# Patient Record
Sex: FEMALE | Race: OTHER | HISPANIC OR LATINO
[De-identification: names, ages, dates, MRNs, and addresses within clinical notes are randomized per-mention and may not be internally consistent; named-entity substitution may affect disease eponyms.]

---

## 2024-07-31 ENCOUNTER — APPOINTMENT (OUTPATIENT)
Dept: ORTHOPEDIC SURGERY | Facility: CLINIC | Age: 69
End: 2024-07-31
Payer: MEDICARE

## 2024-07-31 ENCOUNTER — RESULT REVIEW (OUTPATIENT)
Age: 69
End: 2024-07-31

## 2024-07-31 ENCOUNTER — OUTPATIENT (OUTPATIENT)
Dept: OUTPATIENT SERVICES | Facility: HOSPITAL | Age: 69
LOS: 1 days | End: 2024-07-31
Payer: MEDICARE

## 2024-07-31 VITALS
DIASTOLIC BLOOD PRESSURE: 85 MMHG | HEART RATE: 87 BPM | SYSTOLIC BLOOD PRESSURE: 160 MMHG | OXYGEN SATURATION: 96 % | WEIGHT: 185 LBS | HEIGHT: 66 IN | BODY MASS INDEX: 29.73 KG/M2

## 2024-07-31 DIAGNOSIS — I10 ESSENTIAL (PRIMARY) HYPERTENSION: ICD-10-CM

## 2024-07-31 DIAGNOSIS — M43.05: ICD-10-CM

## 2024-07-31 DIAGNOSIS — E11.9 TYPE 2 DIABETES MELLITUS W/OUT COMPLICATIONS: ICD-10-CM

## 2024-07-31 PROCEDURE — 72110 X-RAY EXAM L-2 SPINE 4/>VWS: CPT | Mod: 26

## 2024-07-31 PROCEDURE — 72110 X-RAY EXAM L-2 SPINE 4/>VWS: CPT

## 2024-07-31 PROCEDURE — 99204 OFFICE O/P NEW MOD 45 MIN: CPT | Mod: 25

## 2024-08-01 RX ORDER — CYCLOBENZAPRINE HYDROCHLORIDE 5 MG/1
5 TABLET, FILM COATED ORAL
Qty: 30 | Refills: 0 | Status: ACTIVE | COMMUNITY
Start: 2024-08-01 | End: 1900-01-01

## 2024-08-01 RX ORDER — MELOXICAM 7.5 MG/1
7.5 TABLET ORAL
Qty: 30 | Refills: 1 | Status: ACTIVE | COMMUNITY
Start: 2024-08-01 | End: 1900-01-01

## 2024-08-05 NOTE — PHYSICAL EXAM
[de-identified] : General: Well-nourished, well-developed, alert, and in no acute distress. Head: Normocephalic. Eyes: Pupils equal, extraocular muscles intact, normal sclera. Nose: No nasal discharge. Cardiovascular: Extremities are warm and well perfused. Distal pulses are symmetric bilaterally. Respiratory: No labored breathing. Extremities: Sensation is intact distally bilaterally. Distal pulses are symmetric bilaterally Lymphatic: No regional lymphadenopathy, no lymphedema Neurologic: No focal deficits Skin: Normal skin color, texture, and turgor Psychiatric: Normal affect  MSK: Examination of the Lumbar Spine: Gait: Slightly antalgic Ambulating independently. Able to toe walk, heel walk, tandem walk AROM: forward flexion limited due to pain, full and pain-free extension. Tender to palpation: midline, b/l thoracic and lumbar paraspinals. Nontender to palpation: SI jt, GTB, piriformis, gluteals   Lumbar Facet Loading [negative]     Right hip Range of Motion: Internal rotation: [25] degrees, External rotation: [80] degrees, Flexion [120] degrees     Log roll negative ERNESTINA negative FADIR negative   SLR negative. Tight Hamstrings Piriformis compression negative ASIS distraction negative Iliac compression negative   Left hip:   Range of Motion: Internal rotation: [25] degrees, External rotation: [80] degrees, Flexion [120] degrees   Special tests: ERNESTINA negative FADIR negative   SLR negative. Tight Hamstrings Piriformis compression negative ASIS distraction negative Iliac compression negative     Sensation is intact to light touch over the superficial and deep peroneal nerve distributions and the posterior tibial nerve distribution. Capillary refill is less than two seconds. Posterior tibial and dorsalis pedis pulses 2+ equal bilaterally. No calf swelling or tenderness bilaterally. Strength testing shows Hip flexion 5/5, Hip adduction 5/5, Hip abduction 5/5, Knee Extension 5/5, Knee Flexion 5/5, dorsiflexion 5/5, plantar flexion 5/5, EHL 5/5 Reflexes: Patellar 2+, Achilles 2+. Babinski negative.    [de-identified] : Date: 07/31/2024 Location: [facility] Body part: Lumbar Spine Impression: Dynamically stable slight L1 on L2, L2 on L3, and L3 on L4 retrolistheses without spondylolysis defects. Remaining vertebral body alignment maintained. Multilevel degenerative disease manifest by varying degrees of disc space narrowing with disc margin osteophyte formation and posterior facet arthrosis.  Unremarkable SI joints and partially visualized hips.  No lytic or blastic lesions.  Right upper quadrant surgical clips.

## 2024-08-05 NOTE — HISTORY OF PRESENT ILLNESS
[de-identified] : MONI DAHL is a 68 year old female presents today for initial visit of back pain. Pain started about 3 days without any injuries. Upper and lower back greater pain on the left side.  Pt. did Aleve and Ibuprofen to help treat the pain, pain patch and ice. Pt. denies any numbness or tingling down her legs and has not experienced any loss of bladder or bowel control.  Pt. states that pain occasionally radiates down her legs.  Pt. reports PMH of Diabetes Mellitus and Hypertension. Pt. is here for further evaluation.

## 2024-08-05 NOTE — PHYSICAL EXAM
[de-identified] : General: Well-nourished, well-developed, alert, and in no acute distress. Head: Normocephalic. Eyes: Pupils equal, extraocular muscles intact, normal sclera. Nose: No nasal discharge. Cardiovascular: Extremities are warm and well perfused. Distal pulses are symmetric bilaterally. Respiratory: No labored breathing. Extremities: Sensation is intact distally bilaterally. Distal pulses are symmetric bilaterally Lymphatic: No regional lymphadenopathy, no lymphedema Neurologic: No focal deficits Skin: Normal skin color, texture, and turgor Psychiatric: Normal affect  MSK: Examination of the Lumbar Spine: Gait: Slightly antalgic Ambulating independently. Able to toe walk, heel walk, tandem walk AROM: forward flexion limited due to pain, full and pain-free extension. Tender to palpation: midline, b/l thoracic and lumbar paraspinals. Nontender to palpation: SI jt, GTB, piriformis, gluteals   Lumbar Facet Loading [negative]     Right hip Range of Motion: Internal rotation: [25] degrees, External rotation: [80] degrees, Flexion [120] degrees     Log roll negative ERNESTINA negative FADIR negative   SLR negative. Tight Hamstrings Piriformis compression negative ASIS distraction negative Iliac compression negative   Left hip:   Range of Motion: Internal rotation: [25] degrees, External rotation: [80] degrees, Flexion [120] degrees   Special tests: ERNESTINA negative FADIR negative   SLR negative. Tight Hamstrings Piriformis compression negative ASIS distraction negative Iliac compression negative     Sensation is intact to light touch over the superficial and deep peroneal nerve distributions and the posterior tibial nerve distribution. Capillary refill is less than two seconds. Posterior tibial and dorsalis pedis pulses 2+ equal bilaterally. No calf swelling or tenderness bilaterally. Strength testing shows Hip flexion 5/5, Hip adduction 5/5, Hip abduction 5/5, Knee Extension 5/5, Knee Flexion 5/5, dorsiflexion 5/5, plantar flexion 5/5, EHL 5/5 Reflexes: Patellar 2+, Achilles 2+. Babinski negative.    [de-identified] : Date: 07/31/2024 Location: [facility] Body part: Lumbar Spine Impression: Dynamically stable slight L1 on L2, L2 on L3, and L3 on L4 retrolistheses without spondylolysis defects. Remaining vertebral body alignment maintained. Multilevel degenerative disease manifest by varying degrees of disc space narrowing with disc margin osteophyte formation and posterior facet arthrosis.  Unremarkable SI joints and partially visualized hips.  No lytic or blastic lesions.  Right upper quadrant surgical clips.

## 2024-08-05 NOTE — END OF VISIT
[FreeTextEntry3] : All medical record entries made by the Susu García (Mariia) were at my, Dr.Leslie Young, direction and personally dictated by me on 08/01/2024. I have reviewed the chart and agree that the record accurately reflects my personal performance of the history, physical exam, assessment, and plan. I have also personally directed, reviewed, and agreed with the chart. [Time Spent: ___ minutes] : I have spent [unfilled] minutes of time on the encounter.

## 2024-08-05 NOTE — HISTORY OF PRESENT ILLNESS
[de-identified] : MONI DAHL is a 68 year old female presents today for initial visit of back pain. Pain started about 3 days without any injuries. Upper and lower back greater pain on the left side.  Pt. did Aleve and Ibuprofen to help treat the pain, pain patch and ice. Pt. denies any numbness or tingling down her legs and has not experienced any loss of bladder or bowel control.  Pt. states that pain occasionally radiates down her legs.  Pt. reports PMH of Diabetes Mellitus and Hypertension. Pt. is here for further evaluation.

## 2024-08-05 NOTE — HISTORY OF PRESENT ILLNESS
[de-identified] : MONI DAHL is a 68 year old female presents today for initial visit of back pain. Pain started about 3 days without any injuries. Upper and lower back greater pain on the left side.  Pt. did Aleve and Ibuprofen to help treat the pain, pain patch and ice. Pt. denies any numbness or tingling down her legs and has not experienced any loss of bladder or bowel control.  Pt. states that pain occasionally radiates down her legs.  Pt. reports PMH of Diabetes Mellitus and Hypertension. Pt. is here for further evaluation.

## 2024-08-05 NOTE — DISCUSSION/SUMMARY

## 2024-08-05 NOTE — REASON FOR VISIT
[Initial Visit] : an initial visit for [[employer] ] : provided by [employer]   [FreeTextEntry2] : back pain [Interpreters_IDNumber] : [id number] [Interpreters_FullName] : Flor [TWNoteComboBox1] : [place]

## 2024-08-05 NOTE — DISCUSSION/SUMMARY

## 2024-08-05 NOTE — REASON FOR VISIT
[Initial Visit] : an initial visit for [[employer] ] : provided by [employer]   [FreeTextEntry2] : back pain [Interpreters_IDNumber] : [id number] [Interpreters_FullName] : Flor [TWNoteComboBox1] : [place]

## 2024-08-05 NOTE — PHYSICAL EXAM
[de-identified] : General: Well-nourished, well-developed, alert, and in no acute distress. Head: Normocephalic. Eyes: Pupils equal, extraocular muscles intact, normal sclera. Nose: No nasal discharge. Cardiovascular: Extremities are warm and well perfused. Distal pulses are symmetric bilaterally. Respiratory: No labored breathing. Extremities: Sensation is intact distally bilaterally. Distal pulses are symmetric bilaterally Lymphatic: No regional lymphadenopathy, no lymphedema Neurologic: No focal deficits Skin: Normal skin color, texture, and turgor Psychiatric: Normal affect  MSK: Examination of the Lumbar Spine: Gait: Slightly antalgic Ambulating independently. Able to toe walk, heel walk, tandem walk AROM: forward flexion limited due to pain, full and pain-free extension. Tender to palpation: midline, b/l thoracic and lumbar paraspinals. Nontender to palpation: SI jt, GTB, piriformis, gluteals   Lumbar Facet Loading [negative]     Right hip Range of Motion: Internal rotation: [25] degrees, External rotation: [80] degrees, Flexion [120] degrees     Log roll negative ERNESTINA negative FADIR negative   SLR negative. Tight Hamstrings Piriformis compression negative ASIS distraction negative Iliac compression negative   Left hip:   Range of Motion: Internal rotation: [25] degrees, External rotation: [80] degrees, Flexion [120] degrees   Special tests: ERNESTINA negative FADIR negative   SLR negative. Tight Hamstrings Piriformis compression negative ASIS distraction negative Iliac compression negative     Sensation is intact to light touch over the superficial and deep peroneal nerve distributions and the posterior tibial nerve distribution. Capillary refill is less than two seconds. Posterior tibial and dorsalis pedis pulses 2+ equal bilaterally. No calf swelling or tenderness bilaterally. Strength testing shows Hip flexion 5/5, Hip adduction 5/5, Hip abduction 5/5, Knee Extension 5/5, Knee Flexion 5/5, dorsiflexion 5/5, plantar flexion 5/5, EHL 5/5 Reflexes: Patellar 2+, Achilles 2+. Babinski negative.    [de-identified] : Date: 07/31/2024 Location: [facility] Body part: Lumbar Spine Impression: Dynamically stable slight L1 on L2, L2 on L3, and L3 on L4 retrolistheses without spondylolysis defects. Remaining vertebral body alignment maintained. Multilevel degenerative disease manifest by varying degrees of disc space narrowing with disc margin osteophyte formation and posterior facet arthrosis.  Unremarkable SI joints and partially visualized hips.  No lytic or blastic lesions.  Right upper quadrant surgical clips.

## 2024-08-05 NOTE — ASSESSMENT
[FreeTextEntry1] : MONI DAHL is a 68 year old female with middle and low back pain. I discussed with the patient that their symptoms, signs, and imaging are most consistent with thoracolumbar spondylosis. We reviewed the natural history of this condition and treatment options. We agreed on the following plan:  XR taken and reviewed with patient today. Activity modification: low impact aerobic activity (stationary bike, elliptical, swimming). Recommend 150 min of moderate intensity aerobic activity per week. Start Home Exercises for spine conditioning. Demonstration and handout provided. Physical therapy. Referral provided. Medication:  Meloxicam PRN and Cyclobenzaprine PRN; prescriptions provided. Apply heat to low back when supine and knees flexed at 90 deg. Demonstrated provided. Advanced imaging: MRI of the thoracic and lumbar spine. Follow-up after imaging.

## 2024-08-05 NOTE — DISCUSSION/SUMMARY

## 2024-08-05 NOTE — REASON FOR VISIT
[Initial Visit] : an initial visit for [[employer] ] : provided by [employer]   [FreeTextEntry2] : back pain [Interpreters_IDNumber] : [id number] [Interpreters_FullName] : Flor [TWNoteComboBox1] : [place]